# Patient Record
Sex: MALE | Race: WHITE | NOT HISPANIC OR LATINO | Employment: OTHER | URBAN - METROPOLITAN AREA
[De-identification: names, ages, dates, MRNs, and addresses within clinical notes are randomized per-mention and may not be internally consistent; named-entity substitution may affect disease eponyms.]

---

## 2017-03-06 ENCOUNTER — ALLSCRIPTS OFFICE VISIT (OUTPATIENT)
Dept: OTHER | Facility: OTHER | Age: 67
End: 2017-03-06

## 2017-03-29 ENCOUNTER — ALLSCRIPTS OFFICE VISIT (OUTPATIENT)
Dept: OTHER | Facility: OTHER | Age: 67
End: 2017-03-29

## 2017-03-29 DIAGNOSIS — M79.89 OTHER SPECIFIED SOFT TISSUE DISORDERS: ICD-10-CM

## 2017-03-29 DIAGNOSIS — M79.661 PAIN OF RIGHT LOWER LEG: ICD-10-CM

## 2017-04-05 ENCOUNTER — HOSPITAL ENCOUNTER (OUTPATIENT)
Dept: ULTRASOUND IMAGING | Facility: HOSPITAL | Age: 67
Discharge: HOME | End: 2017-04-05
Payer: MEDICARE

## 2017-04-05 DIAGNOSIS — M79.661 PAIN OF RIGHT LOWER LEG: ICD-10-CM

## 2017-04-05 DIAGNOSIS — M79.89 OTHER DISORDERS OF SOFT TISSUE: ICD-10-CM

## 2017-04-05 PROCEDURE — 93970 EXTREMITY STUDY: CPT

## 2017-04-12 ENCOUNTER — ALLSCRIPTS OFFICE VISIT (OUTPATIENT)
Dept: OTHER | Facility: OTHER | Age: 67
End: 2017-04-12

## 2017-05-19 ENCOUNTER — GENERIC CONVERSION - ENCOUNTER (OUTPATIENT)
Dept: OTHER | Facility: OTHER | Age: 67
End: 2017-05-19

## 2017-05-22 ENCOUNTER — GENERIC CONVERSION - ENCOUNTER (OUTPATIENT)
Dept: OTHER | Facility: OTHER | Age: 67
End: 2017-05-22

## 2018-01-13 VITALS
BODY MASS INDEX: 33.55 KG/M2 | HEIGHT: 68 IN | TEMPERATURE: 98.5 F | DIASTOLIC BLOOD PRESSURE: 72 MMHG | OXYGEN SATURATION: 98 % | WEIGHT: 221.38 LBS | HEART RATE: 80 BPM | SYSTOLIC BLOOD PRESSURE: 136 MMHG | RESPIRATION RATE: 16 BRPM

## 2018-01-14 VITALS
DIASTOLIC BLOOD PRESSURE: 92 MMHG | RESPIRATION RATE: 16 BRPM | BODY MASS INDEX: 33.86 KG/M2 | OXYGEN SATURATION: 97 % | HEIGHT: 68 IN | SYSTOLIC BLOOD PRESSURE: 134 MMHG | HEART RATE: 78 BPM | TEMPERATURE: 97.6 F | WEIGHT: 223.4 LBS

## 2018-01-14 NOTE — PROGRESS NOTES
Assessment    1  Chronic deep vein thrombosis (DVT) of other vein of right lower extremity (453 50)   (I82 591)   2  History of Need for pneumococcal vaccination (V03 82) (Z23)   3  Need for pneumococcal vaccination (V03 82) (Z23)    Plan  Need for pneumococcal vaccination    · Prevnar 13 Intramuscular Suspension    Discussion/Summary  Impression: Welcome to Medicare Visit, with preventive exam as well as age and risk appropriate counseling completed  Cardiovascular screening and counseling: screening is current and EKG recommended  Diabetes screening and counseling: screening is current  Colorectal cancer screening and counseling: screening is current  Prostate cancer screening and counseling: screening is current  Osteoporosis screening and counseling: counseling was given on obtaining adequate amounts of calcium and vitamin D on a daily basis and counseling was given on the importance of regular weightbearing exercise  Abdominal aortic aneurysm screening and counseling: screening not indicated  Glaucoma screening and counseling: screening is current  HIV screening and counseling: screening not indicated  Immunizations: influenza vaccine is up to date this year, pneumococcal vaccine due today, prevnar 13 , the patient declines the Zostavax vaccine and Tdap vaccination up to date  Advance Directive Planning: not complete  The treatment plan was reviewed with the patient/guardian  The patient/guardian understands and agrees with the treatment plan      Chief Complaint  welcome to medicare      History of Present Illness  HPI: pt presetsn for his medicare physical  pt's right lkower leg ultrsound shows chroic dvt  pt is on asa  no new findings  swelling and pain decreased  pt will be moving   Welcome to [employer] and Wellness Visits: The patient is being seen for the welcome to medicare visit     Medicare Screening and Risk Factors   Hospitalizations: he has been previously hospitalizied and july 2016 cp  Once per lifetime medicare screening tests: ECG (sinus bradycardia)  Medicare Screening Tests Risk Questions   Abdominal aortic aneurysm risk assessment: over 72years of age  Osteoporosis risk assessment:  and over 48years of age  HIV risk assessment: none indicated  Drug and Alcohol Use: The patient has never smoked cigarettes  The patient reports occasional alcohol use  He has never used illicit drugs  Diet and Physical Activity: Current diet includes well balanced meals  He exercises gym times per week  Exercise: walking  Mood Disorder and Cognitive Impairment Screening: He denies feeling down, depressed, or hopeless over the past two weeks  He denies feeling little interest or pleasure in doing things over the past two weeks  Cognitive impairment screening: denies difficulty learning/retaining new information, denies difficulty handling complex tasks, denies difficulty with reasoning, denies difficulty with spatial ability and orientation, denies difficulty with language and denies difficulty with behavior  Functional Ability/Level of Safety: Hearing is normal bilaterally and a hearing aid is not used  The patient is currently able to do activities of daily living without limitations, able to do instrumental activities of daily living without limitations, able to participate in social activities without limitations and able to drive without limitations  Activities of daily living details: does not need help using the phone, no transportation help needed, does not need help shopping, no meal preparation help needed, does not need help doing housework, does not need help doing laundry, does not need help managing medications and does not need help managing money   Fall risk factors:  polypharmacy, alcohol use and antihypertensive use, but The patient fell 0 times in the past 12 months , no mobility impairment, no deconditioning, no visual impairment, no urinary incontinence and no cognitive impairment  Home safety risk factors:  no grab bars in the bathroom and pt moving to new home, but no unfamiliar surroundings, no loose rugs, no poor household lighting, no uneven floors, no household clutter and handrails on the stairs  Advance Directives: Advance directives: no living will, no durable power of  for health care directives and no advance directives  Co-Managers and Medical Equipment/Suppliers: See Patient Care Team      Patient Care Team    Care Team Member Role Specialty Office Number   [facility]  Family Medicine [phone]   Aleah Anne MD  Cardiology [phone]   [place] [address]  Allergy/Immunology [phone]   Mohinder Stone MD  Urology [phone]   [facility] [phone]     Review of Systems    Constitutional: no fever  Eyes: no eye pain  ENT: no sore throat, no nasal congestion and no earache  Cardiovascular: lower extremity edema, but no chest pain  Respiratory: no shortness of breath and no cough  Gastrointestinal: no abdominal pain, no nausea, no vomiting, no diarrhea, no constipation, no bright red blood per rectum and no melena  Genitourinary: no dysuria  Musculoskeletal: no diffuse joint pain  Integumentary and Breasts: no rashes  Neurological: no headache  Psychiatric: no insomnia  Active Problems    1  Anxiety (300 00) (F41 9)   2  Asthma, moderate persistent, well-controlled (493 90) (J45 40)   3  Benign essential hypertension (401 1) (I10)   4  Colon cancer screening (V76 51) (Z12 11)   5  Depression (311) (F32 9)   6  Depression screening (V79 0) (Z13 89)   7  Diverticulosis of sigmoid colon (562 10) (K57 30)   8  Glaucoma (365 9) (H40 9)   9  Hyperlipidemia (272 4) (E78 5)   10  Impaired fasting blood sugar (790 21) (R73 01)   11  Need for DTaP vaccine (V06 1) (Z23)   12   Skin changes due to chronic exposure to nonionizing radiation (692 74) (L57 9)   13  Special screening examination for neoplasm of prostate (V76 44) (Z12 5)   14  Testicular hypogonadism (257 2) (E29 1)   15  Varicella vaccination (V05 4) (Z23)    Past Medical History    · History of Acute bronchitis (466 0) (J20 9)   · History of Acute bronchospasm (519 11) (J98 01)   · History of Angina pectoris (413 9) (I20 9)   · History of Denial of substance abuse   · History of Elevated blood pressure reading (401 9) (I10)   · History of Laceration of thumb, left (883 0) (S61 012A)   · History of Need for pneumococcal vaccination (V03 82) (Z23)   · History of Withdrawal from benzodiazepine (292 0,304 10) (F13 239)    The active problems and past medical history were reviewed and updated today  Surgical History    · History of Knee Arthroplasty   · History of Knee Surgery   · History of Shoulder Arthroscopy   · History of Shoulder Surgery   · History of Total Knee Arthroplasty    The surgical history was reviewed and updated today  Family History  Mother    · FHx: diabetes mellitus (V18 0) (Z83 3)  Father    · Family history of Congestive Heart Failure   · Family history of cardiac disorder (V17 49) (Z82 49)   · Family history of hypertension (V17 49) (Z82 49)   · Family history of Reported Family History Ischemic Heart Disease Before Age 48  Family History    · Denied: Family history of Denial of substance abuse   · FHx: diabetes mellitus (V18 0) (Z83 3)   · Denied: No family history of mental disorder    The family history was reviewed and updated today  Social History    · Alcohol Use (History)   · once a week   · Daily Coffee Consumption (3  Cups/Day)   · Denied: History of Drug Use   · Never a smoker   · Denied: History of Tobacco Use   · Uses Safety Equipment - Seatbelts  The social history was reviewed and updated today  The social history was reviewed and is unchanged  Current Meds   1  ALPRAZolam 0 25 MG Oral Tablet; TAKE 1 TABLET TWICE DAILY AS NEEDED;    Therapy: 16JIT9213 to (Renew:07Lth8332); Last Rx:26Apr2016 Ordered   2  Aspirin 81 MG TABS; Therapy: (Recorded:01Oct2015) to Recorded   3  Atorvastatin Calcium 40 MG Oral Tablet; Therapy: (Recorded:01Oct2015) to Recorded   4  BusPIRone HCl - 15 MG Oral Tablet; TAKE 1 TABLET TWICE DAILY; Therapy: 13JGT9400 to (Evaluate:01Qvf0297)  Requested for: 74VJA8663; Last   GS:81ORA8354 Ordered   5  Cardizem  MG Oral Capsule Extended Release 24 Hour; Therapy: 33Jdr3682 to Recorded   6  Co Q-10 CAPS; Therapy: [phone]) to Recorded   7  Fish Oil CAPS; Therapy: [phone]) to Recorded   8  Losartan Potassium-HCTZ 100-12 5 MG Oral Tablet; take one tablet by mouth every day; Therapy: 11ZFC9820 to (Last Rx:01Jun2016)  Requested for: 01Jun2016 Ordered   9  Nitrostat 0 4 MG Sublingual Tablet Sublingual; PLACE 1 TABLET UNDER THE TONGUE   EVERY 5 MINUTES UP TO 3 DOSES AS NEEDED FOR CHEST PAIN;   Therapy: 05FAO1754 to (Last Rx:26Nov2013)  Requested for: 70ZBK9199 Ordered   10  Proventil  (90 Base) MCG/ACT Inhalation Aerosol Solution; inhale 1 to 2 puffs    every 4 to 6 hours as needed; Therapy: 91PKL8557 to Recorded   11  Qvar 80 MCG/ACT Inhalation Aerosol Solution; Inhale 1 puff twice daily; Therapy: 28UNW1732 to (Evaluate:58Khx7547) Recorded   12  Simbrinza 1-0 2 % Ophthalmic Suspension; Therapy: 38MSN3370 to Recorded   13  Singulair 10 MG Oral Tablet; Therapy: [phone]) to Recorded   14  Vitamin B-12 1000 MCG Oral Tablet; Therapy: [phone]) to Recorded   15  Vitamin D3 5000 UNIT Oral Capsule; Therapy: (Recorded:06Oct2015) to Recorded   16  Zostavax 16393 UNT/0 65ML Subcutaneous Solution Reconstituted; INJECT 0 65 ML    Once; Therapy: 91JYS1631 to (Evaluate:08Oct2015); Last Rx:07Oct2015 Ordered    The medication list was reviewed and updated today  Allergies    1   No Known Drug Allergies    Immunizations   1 2    Influenza  30-Sep-2010 29-Oct-2015    PPSV  07-Oct-2015     Tdap 11-Aug-2014     Tetanus  21-Jul-2005      Vitals  Signs    Temperature: 97 4 F, Tympanic  Heart Rate: 63, L Brachial Artery  Pulse Quality: Normal, L Brachial Artery  Respiration Quality: Normal  Respiration: 16  Systolic: 229, LUE, Sitting  Diastolic: 86, LUE, Sitting  Height: 5 ft 8 in  Weight: 227 lb 6 4 oz  BMI Calculated: 34 58  BSA Calculated: 2 16  O2 Saturation: 97    Physical Exam    Constitutional   General appearance: No acute distress, well appearing and well nourished  well developed and overweight  Head and Face   Head and face: Normal     Eyes   Conjunctiva and lids: No erythema, swelling or discharge  Pupils and irises: Equal, round, reactive to light  Ears, Nose, Mouth, and Throat   External inspection of ears and nose: Normal     Otoscopic examination: Tympanic membranes translucent with normal light reflex  Canals patent without erythema  Oropharynx: Normal with no erythema, edema, exudate or lesions  Neck   Neck: Supple, symmetric, trachea midline, no masses  Thyroid: Normal, no thyromegaly  Pulmonary   Respiratory effort: No increased work of breathing or signs of respiratory distress  Auscultation of lungs: Clear to auscultation  Cardiovascular   Auscultation of heart: Normal rate and rhythm, normal S1 and S2, no murmurs  The heart rate was bradycardic  The rhythm was regular  no murmurs were heard  Carotid pulses: 2+ bilaterally  Examination of extremities for edema and/or varicosities: Normal     Abdomen   Abdomen: Non-tender, no masses  Liver and spleen: No hepatomegaly or splenomegaly  Lymphatic   Palpation of lymph nodes in neck: No lymphadenopathy  Musculoskeletal   Gait and station: Normal     Inspection/palpation of digits and nails: Normal without clubbing or cyanosis  Examination of the extremities revealed no fingernail clubbing and well perfused fingers     Muscle strength/tone: Normal     Skin   Skin and subcutaneous tissue: Normal without rashes or lesions  Neurologic   Cranial nerves: Cranial nerves 2-12 intact  Cranial Nerve II: visual acuity and visual fields were intact  Cranial Nerves III, IV, and VI: the extraocular motions were intact  Cranial Nerve V: sensation to the face and masseter strength were intact  Reflexes: 2+ and symmetric  Deep tendon reflexes: 0 right patella2+ left patella  Psychiatric   Judgment and insight: Normal     Mood and affect: Normal        Procedure    Procedure: Visual Acuity Test    Indication: routine screening  Results: 20/20 in both eyes with corrective device, 20/25 in the right eye with corrective device, 20/30 in the left eye with corrective device normal in both eyes  Color vision was and the results were normal    The patient tolerated the procedure well  There were no complications  Signatures   Electronically signed by : NICKI Sheets;  Apr 12 2017  2:37PM EST                       (Author)    Electronically signed by : XAVIER Acosta ; Apr 13 2017  9:50AM EST                       (Author)

## 2018-01-22 VITALS
SYSTOLIC BLOOD PRESSURE: 122 MMHG | HEIGHT: 68 IN | WEIGHT: 227.4 LBS | TEMPERATURE: 97.4 F | HEART RATE: 63 BPM | OXYGEN SATURATION: 97 % | BODY MASS INDEX: 34.46 KG/M2 | RESPIRATION RATE: 16 BRPM | DIASTOLIC BLOOD PRESSURE: 86 MMHG

## 2018-11-09 ENCOUNTER — TELEPHONE (OUTPATIENT)
Dept: FAMILY MEDICINE CLINIC | Facility: CLINIC | Age: 68
End: 2018-11-09

## 2018-11-09 NOTE — TELEPHONE ENCOUNTER
----- Message from Sunil Aceves MA sent at 11/7/2018  4:22 PM EST -----  Regarding: awv  Patient due for awv

## 2018-11-09 NOTE — TELEPHONE ENCOUNTER
Patient moved to [facility] about a year and a half ago and had his annual well with his provider there